# Patient Record
Sex: FEMALE | HISPANIC OR LATINO | URBAN - METROPOLITAN AREA
[De-identification: names, ages, dates, MRNs, and addresses within clinical notes are randomized per-mention and may not be internally consistent; named-entity substitution may affect disease eponyms.]

---

## 2020-06-19 ENCOUNTER — OFFICE VISIT (OUTPATIENT)
Dept: URBAN - METROPOLITAN AREA CLINIC 29 | Facility: CLINIC | Age: 35
End: 2020-06-19
Payer: COMMERCIAL

## 2020-06-19 DIAGNOSIS — R51 HEADACHE: Primary | ICD-10-CM

## 2020-06-19 PROCEDURE — 92004 COMPRE OPH EXAM NEW PT 1/>: CPT | Performed by: OPTOMETRIST

## 2020-06-19 PROCEDURE — 92014 COMPRE OPH EXAM EST PT 1/>: CPT | Performed by: OPTOMETRIST

## 2020-06-19 ASSESSMENT — INTRAOCULAR PRESSURE
OS: 16
OD: 14

## 2020-06-19 NOTE — IMPRESSION/PLAN
Impression: Headache: R51. OU. Normal Ocular Health without signs of disease OU. Plan: Discussed diagnosis in detail with patient. Discussed treatment options with patient. Will continue to observe condition and or symptoms. Reassured patient of current condition and treatment.

## 2020-11-18 ENCOUNTER — OFFICE VISIT (OUTPATIENT)
Dept: URBAN - METROPOLITAN AREA CLINIC 29 | Facility: CLINIC | Age: 35
End: 2020-11-18
Payer: COMMERCIAL

## 2020-11-18 DIAGNOSIS — H53.131 SUDDEN VISUAL LOSS, RIGHT EYE: Primary | ICD-10-CM

## 2020-11-18 PROCEDURE — 92014 COMPRE OPH EXAM EST PT 1/>: CPT | Performed by: OPTOMETRIST

## 2020-11-18 ASSESSMENT — INTRAOCULAR PRESSURE
OD: 16
OS: 16

## 2020-11-18 NOTE — IMPRESSION/PLAN
Impression: Sudden visual loss, right eye: H53.131. Plan: Discussed diagnosis in detail with patient. Discussed treatment options with patient. Consult recommended [Neurologist]. Will continue to observe condition and or symptoms. Reassured patient of current condition and treatment. Discussed risks of progression. Call if symptoms worsens.
PHYSICAL EXAM:    Constitutional: NAD, awake and alert, well-developed  HEENT: PERR, EOMI, Normal Hearing, MMM  Neck: Soft and supple, No LAD, No JVD  Respiratory: Breath sounds are clear bilaterally, No wheezing, rales or rhonchi  Cardiovascular: S1 and S2, regular rate and rhythm, no Murmurs, gallops or rubs  Gastrointestinal: Bowel Sounds present, soft, nontender, nondistended, no guarding, no rebound  Extremities: No peripheral edema  Vascular: 2+ peripheral pulses  Neurological: A/O x 3, no focal deficits  Musculoskeletal: 5/5 strength b/l upper and lower extremities  Skin: No rashes

## 2021-06-24 ENCOUNTER — OFFICE VISIT (OUTPATIENT)
Dept: URBAN - METROPOLITAN AREA CLINIC 29 | Facility: CLINIC | Age: 36
End: 2021-06-24
Payer: COMMERCIAL

## 2021-06-24 DIAGNOSIS — H52.221 REGULAR ASTIGMATISM, RIGHT EYE: Primary | ICD-10-CM

## 2021-06-24 DIAGNOSIS — H05.20 UNSPECIFIED EXOPHTHALMOS: ICD-10-CM

## 2021-06-24 DIAGNOSIS — H16.211 EXPOSURE KERATOCONJUNCTIVITIS OF RIGHT EYE: ICD-10-CM

## 2021-06-24 PROCEDURE — 92014 COMPRE OPH EXAM EST PT 1/>: CPT | Performed by: OPTOMETRIST

## 2021-06-24 ASSESSMENT — KERATOMETRY
OS: 37.75
OD: 37.75

## 2021-06-24 ASSESSMENT — VISUAL ACUITY: OD: 20/25

## 2021-06-24 ASSESSMENT — INTRAOCULAR PRESSURE
OD: 16
OS: 15

## 2021-06-24 NOTE — IMPRESSION/PLAN
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 - incomplete blinking OD Plan: recommend frequent art tears OD